# Patient Record
Sex: MALE | Employment: OTHER | URBAN - METROPOLITAN AREA
[De-identification: names, ages, dates, MRNs, and addresses within clinical notes are randomized per-mention and may not be internally consistent; named-entity substitution may affect disease eponyms.]

---

## 2019-01-24 ENCOUNTER — THERAPY VISIT (OUTPATIENT)
Dept: PHYSICAL THERAPY | Facility: CLINIC | Age: 84
End: 2019-01-24
Payer: COMMERCIAL

## 2019-01-24 DIAGNOSIS — M51.369 DDD (DEGENERATIVE DISC DISEASE), LUMBAR: ICD-10-CM

## 2019-01-24 PROCEDURE — 97110 THERAPEUTIC EXERCISES: CPT | Mod: GP | Performed by: PHYSICAL THERAPIST

## 2019-01-24 PROCEDURE — 97161 PT EVAL LOW COMPLEX 20 MIN: CPT | Mod: GP | Performed by: PHYSICAL THERAPIST

## 2019-01-24 PROCEDURE — G8979 MOBILITY GOAL STATUS: HCPCS | Mod: GP | Performed by: PHYSICAL THERAPIST

## 2019-01-24 PROCEDURE — 97112 NEUROMUSCULAR REEDUCATION: CPT | Mod: GP | Performed by: PHYSICAL THERAPIST

## 2019-01-24 PROCEDURE — G8978 MOBILITY CURRENT STATUS: HCPCS | Mod: GP | Performed by: PHYSICAL THERAPIST

## 2019-01-24 NOTE — PROGRESS NOTES
[facility] Initial Evaluation  Subjective:  The history is provided by the patient. No  was used.   Vanessa Espinosa is a 84 year old male with a lumbar condition.  Condition occurred with:  Insidious onset.  Condition occurred: for unknown reasons.  This is a chronic condition  12/20/2018.    Patient reports pain:  Central lumbar spine.  Radiates to:  Gluteals left and gluteals right.  Pain is described as aching and is intermittent and reported as 10/10.  Associated symptoms:  Other. Pain is worse in the A.M..  Symptoms are exacerbated by walking Relieved by: BENDING, SITTING.  Since onset symptoms are gradually worsening.    Previous treatment: NONE.      Pertinent medical history includes:  Implanted device, sleep disorder/apnea, diabetes and heart problems (PACEMAKER).  Medical allergies: no.  Other surgeries include:  Heart surgery.  Current medications:  Cardiac medication (MULTIPLE).  Current occupation is RETIRED.        Barriers include:  Requires assistance with ADL's.    Red flags:  None as reported by the patient.                        Objective:  System    Physical Exam      [facility] Lumbar Evaluation    Posture:  Sitting: poor    Lordosis: Reduced    Correction of Posture: better    Movement Loss:  Flexion (Flex): mod    Side Glide R (SG R): mod and pain  Side Glide L (SG L): mod and pain  Test Movements:  FIS: During: no effect      EIS: During: centralizing    Repeat EIS: During: centralizing              Conclusion: derangement  Principle of Treatment:  Posture Correction: IN SITTING WITH TOWEL ROLL.     Extension: EIS WITH SUPPORT    Other: NEUTRAL SPINE, THER EX, THER ACT, NMR, MANUAL THERAPY.                                       ROS    Assessment/Plan:    Patient is a 84 year old male with lumbar complaints.    Patient has the following significant findings with corresponding treatment plan.                Diagnosis 1:  LUMBAR DDD  Pain -  hot/cold  therapy, US, electric stimulation, mechanical traction, manual therapy, splint/taping/bracing/orthotics, self management, education, directional preference exercise and home program  Decreased ROM/flexibility - manual therapy, therapeutic exercise, therapeutic activity and home program  Decreased function - therapeutic activities and home program  Impaired posture - neuro re-education, therapeutic activities and home program    Therapy Evaluation Codes:   1) History comprised of:   Personal factors that impact the plan of care:      Age and Past/current experiences.    Comorbidity factors that impact the plan of care are:      Diabetes and Heart problems.     Medications impacting care: MULTIPLE MEDS..  2) Examination of Body Systems comprised of:   Body structures and functions that impact the plan of care:      Lumbar spine.   Activity limitations that impact the plan of care are:      Bathing, Cooking, Dressing, Lifting and Walking.  3) Clinical presentation characteristics are:   Stable/Uncomplicated.  4) Decision-Making    Low complexity using standardized patient assessment instrument and/or measureable assessment of functional outcome.  Cumulative Therapy Evaluation is: Low complexity.    Previous and current functional limitations:  (See Goal Flow Sheet for this information)    Short term and Long term goals: (See Goal Flow Sheet for this information)     Communication ability:  Patient appears to be able to clearly communicate and understand verbal and written communication and follow directions correctly.  Treatment Explanation - The following has been discussed with the patient:   RX ordered/plan of care  Anticipated outcomes  Possible risks and side effects  This patient would benefit from PT intervention to resume normal activities.   Rehab potential is fair.  Frequency:  1 X week, once daily  Duration:  for 6 weeks  Discharge Plan:  Achieve all LTG.  Independent in home treatment program.  Reach  maximal therapeutic benefit.    Please refer to the daily flowsheet for treatment today, total treatment time and time spent performing 1:1 timed codes.

## 2019-01-24 NOTE — LETTER
[facility]  [address]  [facility] [address]  [phone]    2019    Re: Vanessa Espinosa   :   1934  MRN:  [id number]   REFERRING PHYSICIAN:   Hung Alegria    [facility]    Date of Initial Evaluation:2019  Visits:  Rxs Used: 1  Reason for Referral:  DDD (degenerative disc disease), lumbar    EVALUATION SUMMARY    [facility] Initial [facility]  Subjective:  The history is provided by the patient. No  was used.   Vanessa Espinosa is a 84 year old male with a lumbar condition.  Condition occurred with:  Insidious onset.  Condition occurred: for unknown reasons.  This is a chronic condition  2018.    Patient reports pain:  Central lumbar spine.  Radiates to:  Gluteals left and gluteals right.  Pain is described as aching and is intermittent and reported as 10/10.  Associated symptoms:  Other. Pain is worse in the A.M..  Symptoms are exacerbated by walking Relieved by: BENDING, SITTING.  Since onset symptoms are gradually worsening.    Previous treatment: NONE.      Pertinent medical history includes:  Implanted device, sleep disorder/apnea, diabetes and heart problems (PACEMAKER).  Medical allergies: no.  Other surgeries include:  Heart surgery.  Current medications:  Cardiac medication (MULTIPLE).  Current occupation is RETIRED.      Barriers include:  Requires assistance with ADL's.  Red flags:  None as reported by the patient.  Objective:  System  Physical Exam  [facility] Lumbar Evaluation  Posture:  Sitting: poor  Lordosis: Reduced  Correction of Posture: better  Movement Loss:  Flexion (Flex): mod  Side Glide R (SG R): mod and pain  Side Glide L (SG L): mod and pain  Test Movements:  FIS: During: no effect    EIS: During: centralizing    Repeat EIS: During: centralizing    Conclusion: derangement  Principle of Treatment:  Posture Correction: IN SITTING WITH  TOWEL ROLL.   Extension: EIS WITH SUPPORT  Other: NEUTRAL SPINE, THER EX, THER ACT, NMR, MANUAL THERAPY.                                   Assessment/Plan:    Patient is a 84 year old male with lumbar complaints.    Patient has the following significant findings with corresponding treatment plan.                Diagnosis 1:  LUMBAR DDD  Pain -  hot/cold therapy, US, electric stimulation, mechanical traction, manual therapy, splint/taping/bracing/orthotics, self management, education, directional preference exercise and home program  Decreased ROM/flexibility - manual therapy, therapeutic exercise, therapeutic activity and home program  Decreased function - therapeutic activities and home program  Impaired posture - neuro re-education, therapeutic activities and home program    Therapy Evaluation Codes:   1) History comprised of:   Personal factors that impact the plan of care:      Age and Past/current experiences.    Comorbidity factors that impact the plan of care are:      Diabetes and Heart problems.     Medications impacting care: MULTIPLE MEDS..  2) Examination of Body Systems comprised of:   Body structures and functions that impact the plan of care:      Lumbar spine.   Activity limitations that impact the plan of care are:      Bathing, Cooking, Dressing, Lifting and Walking.  3) Clinical presentation characteristics are:   Stable/Uncomplicated.  4) Decision-Making    Low complexity using standardized patient assessment instrument and/or measureable assessment of functional outcome.  Cumulative Therapy Evaluation is: Low complexity.  Previous and current functional limitations:  (See Goal Flow Sheet for this information)    Short term and Long term goals: (See Goal Flow Sheet for this information)   Communication ability:  Patient appears to be able to clearly communicate and understand verbal and written communication and follow directions correctly.  Treatment Explanation - The following has been  discussed with the patient:   RX ordered/plan of care  Anticipated outcomes  Possible risks and side effects  This patient would benefit from PT intervention to resume normal activities.   Rehab potential is fair.  Frequency:  1 X week, once daily  Duration:  for 6 weeks  Discharge Plan:  Achieve all LTG.  Independent in home treatment program.  Reach maximal therapeutic benefit.    Please refer to the daily flowsheet for treatment today, total treatment time and time spent performing 1:1 timed codes.     Thank you for your referral.    INQUIRIES  Therapist: Elias Holley, PT  [facility]  [address]  [facility] [address]  Phone: [phone]  Fax: [phone]

## 2019-01-31 ENCOUNTER — THERAPY VISIT (OUTPATIENT)
Dept: PHYSICAL THERAPY | Facility: CLINIC | Age: 84
End: 2019-01-31
Payer: COMMERCIAL

## 2019-01-31 DIAGNOSIS — M51.369 DDD (DEGENERATIVE DISC DISEASE), LUMBAR: ICD-10-CM

## 2019-01-31 PROCEDURE — 97112 NEUROMUSCULAR REEDUCATION: CPT | Mod: GP | Performed by: PHYSICAL THERAPIST

## 2019-01-31 PROCEDURE — 97140 MANUAL THERAPY 1/> REGIONS: CPT | Mod: GP | Performed by: PHYSICAL THERAPIST

## 2019-01-31 PROCEDURE — 97010 HOT OR COLD PACKS THERAPY: CPT | Mod: GP | Performed by: PHYSICAL THERAPIST

## 2019-12-04 PROBLEM — M51.369 DDD (DEGENERATIVE DISC DISEASE), LUMBAR: Status: RESOLVED | Noted: 2019-01-24 | Resolved: 2019-01-01

## 2019-12-04 NOTE — PROGRESS NOTES
Patient did not complete the planned course of treatment so current status is unknown.  Please see 1/31/2019 flowsheet for discharge status.  Discharge from physical therapy at this time.